# Patient Record
Sex: FEMALE | Race: WHITE | Employment: FULL TIME | URBAN - NONMETROPOLITAN AREA
[De-identification: names, ages, dates, MRNs, and addresses within clinical notes are randomized per-mention and may not be internally consistent; named-entity substitution may affect disease eponyms.]

---

## 2017-07-03 ENCOUNTER — TELEPHONE (OUTPATIENT)
Dept: FAMILY MEDICINE CLINIC | Age: 32
End: 2017-07-03

## 2017-07-03 DIAGNOSIS — N92.6 MISSED PERIOD: Primary | ICD-10-CM

## 2017-07-03 DIAGNOSIS — N92.6 MISSED PERIOD: ICD-10-CM

## 2017-07-03 LAB — GONADOTROPIN, CHORIONIC (HCG) QUANT: 311.8 MIU/ML

## 2017-12-12 ENCOUNTER — HOSPITAL ENCOUNTER (OUTPATIENT)
Age: 32
Discharge: HOME | End: 2017-12-12
Attending: OBSTETRICS & GYNECOLOGY | Admitting: OBSTETRICS & GYNECOLOGY
Payer: COMMERCIAL

## 2017-12-12 VITALS
WEIGHT: 169.2 LBS | TEMPERATURE: 97.8 F | BODY MASS INDEX: 28.89 KG/M2 | HEART RATE: 109 BPM | HEIGHT: 64 IN | SYSTOLIC BLOOD PRESSURE: 137 MMHG | DIASTOLIC BLOOD PRESSURE: 67 MMHG | RESPIRATION RATE: 18 BRPM

## 2017-12-12 LAB
AMORPHOUS: ABNORMAL
AMPHETAMINE SCREEN, URINE: NORMAL
BACTERIA: ABNORMAL /HPF
BARBITURATE SCREEN URINE: NORMAL
BENZODIAZEPINE SCREEN, URINE: NORMAL
BILIRUBIN URINE: NEGATIVE
BLOOD, URINE: ABNORMAL
CANNABINOID SCREEN URINE: NORMAL
CLARITY: ABNORMAL
COCAINE METABOLITE SCREEN URINE: NORMAL
COLOR: ABNORMAL
EPITHELIAL CELLS, UA: ABNORMAL /HPF
GLUCOSE URINE: NEGATIVE MG/DL
KETONES, URINE: NEGATIVE MG/DL
LEUKOCYTE ESTERASE, URINE: ABNORMAL
Lab: NORMAL
MUCUS: PRESENT
NITRITE, URINE: NEGATIVE
OPIATE SCREEN URINE: NORMAL
PH UA: 7.5 (ref 5–9)
PHENCYCLIDINE SCREEN URINE: NORMAL
PROTEIN UA: NEGATIVE MG/DL
RBC UA: ABNORMAL /HPF (ref 0–2)
SPECIFIC GRAVITY UA: 1.01 (ref 1–1.03)
UROBILINOGEN, URINE: 0.2 E.U./DL
WBC UA: ABNORMAL /HPF (ref 0–5)

## 2017-12-12 PROCEDURE — 6360000002 HC RX W HCPCS

## 2017-12-12 PROCEDURE — 99284 EMERGENCY DEPT VISIT MOD MDM: CPT

## 2017-12-12 PROCEDURE — 81001 URINALYSIS AUTO W/SCOPE: CPT

## 2017-12-12 PROCEDURE — 96361 HYDRATE IV INFUSION ADD-ON: CPT

## 2017-12-12 PROCEDURE — 96372 THER/PROPH/DIAG INJ SC/IM: CPT

## 2017-12-12 PROCEDURE — 80307 DRUG TEST PRSMV CHEM ANLYZR: CPT

## 2017-12-12 PROCEDURE — 59025 FETAL NON-STRESS TEST: CPT

## 2017-12-12 PROCEDURE — 96375 TX/PRO/DX INJ NEW DRUG ADDON: CPT

## 2017-12-12 PROCEDURE — 96374 THER/PROPH/DIAG INJ IV PUSH: CPT

## 2017-12-12 PROCEDURE — 2580000003 HC RX 258

## 2017-12-12 RX ORDER — ONDANSETRON 2 MG/ML
INJECTION INTRAMUSCULAR; INTRAVENOUS
Status: COMPLETED
Start: 2017-12-12 | End: 2017-12-12

## 2017-12-12 RX ORDER — TERBUTALINE SULFATE 1 MG/ML
0.25 INJECTION, SOLUTION SUBCUTANEOUS ONCE
Status: COMPLETED | OUTPATIENT
Start: 2017-12-12 | End: 2017-12-12

## 2017-12-12 RX ORDER — SODIUM CHLORIDE, SODIUM LACTATE, POTASSIUM CHLORIDE, CALCIUM CHLORIDE 600; 310; 30; 20 MG/100ML; MG/100ML; MG/100ML; MG/100ML
INJECTION, SOLUTION INTRAVENOUS
Status: COMPLETED
Start: 2017-12-12 | End: 2017-12-12

## 2017-12-12 RX ORDER — ONDANSETRON 2 MG/ML
4 INJECTION INTRAMUSCULAR; INTRAVENOUS EVERY 6 HOURS PRN
Status: DISCONTINUED | OUTPATIENT
Start: 2017-12-12 | End: 2017-12-12 | Stop reason: HOSPADM

## 2017-12-12 RX ORDER — NALBUPHINE HCL 10 MG/ML
AMPUL (ML) INJECTION
Status: COMPLETED
Start: 2017-12-12 | End: 2017-12-12

## 2017-12-12 RX ORDER — SODIUM CHLORIDE, SODIUM LACTATE, POTASSIUM CHLORIDE, CALCIUM CHLORIDE 600; 310; 30; 20 MG/100ML; MG/100ML; MG/100ML; MG/100ML
1000 INJECTION, SOLUTION INTRAVENOUS ONCE
Status: COMPLETED | OUTPATIENT
Start: 2017-12-12 | End: 2017-12-12

## 2017-12-12 RX ORDER — NALBUPHINE HCL 10 MG/ML
10 AMPUL (ML) INJECTION
Status: DISCONTINUED | OUTPATIENT
Start: 2017-12-12 | End: 2017-12-12 | Stop reason: HOSPADM

## 2017-12-12 RX ORDER — TERBUTALINE SULFATE 1 MG/ML
INJECTION, SOLUTION SUBCUTANEOUS
Status: COMPLETED
Start: 2017-12-12 | End: 2017-12-12

## 2017-12-12 RX ADMIN — NALBUPHINE HYDROCHLORIDE 10 MG: 10 INJECTION, SOLUTION INTRAMUSCULAR; INTRAVENOUS; SUBCUTANEOUS at 03:35

## 2017-12-12 RX ADMIN — Medication 10 MG: at 03:35

## 2017-12-12 RX ADMIN — TERBUTALINE SULFATE 0.25 MG: 1 INJECTION, SOLUTION SUBCUTANEOUS at 03:14

## 2017-12-12 RX ADMIN — ONDANSETRON 4 MG: 2 INJECTION INTRAMUSCULAR; INTRAVENOUS at 03:16

## 2017-12-12 RX ADMIN — SODIUM CHLORIDE, SODIUM LACTATE, POTASSIUM CHLORIDE, CALCIUM CHLORIDE 1000 ML: 600; 310; 30; 20 INJECTION, SOLUTION INTRAVENOUS at 02:44

## 2017-12-12 RX ADMIN — TERBUTALINE SULFATE 0.25 MG: 1 INJECTION SUBCUTANEOUS at 03:14

## 2017-12-12 RX ADMIN — SODIUM CHLORIDE, POTASSIUM CHLORIDE, SODIUM LACTATE AND CALCIUM CHLORIDE 1000 ML: 600; 310; 30; 20 INJECTION, SOLUTION INTRAVENOUS at 02:44

## 2017-12-12 ASSESSMENT — PAIN SCALES - GENERAL: PAINLEVEL_OUTOF10: 9

## 2017-12-12 NOTE — FLOWSHEET NOTE
D/c instructions given to pt. No questions at this time. States she will call in the morning for an OB follow up appointment.

## 2017-12-12 NOTE — ED PROVIDER NOTES
Department of Obstetrics and Gynecology  Labor and Delivery  Attending Triage Note      SUBJECTIVE:  33 y.o.  Ghulam Tierney @ 27w1d  Presents with complaints of   Chief Complaint   Patient presents with    Abdominal Pain     stated at 9pm in 12--17       previous  section. States was told she has a placenta previa. No bleeding, LOF or UCS. Pain started in her back  Fetal Movement    Yes  ROM No  Vaginal Bleeding No  Contractions No    OBJECTIVE    Vitals:  /67   Pulse 109   Temp 97.8 °F (36.6 °C) (Oral)   Resp 18   Ht 5' 4\" (1.626 m)   Wt 169 lb 3.2 oz (76.7 kg)   LMP 2017   BMI 29.04 kg/m²     CONSTITUTIONAL:  awake, alert, cooperative, no apparent distress, and appears stated age    Cervix:             Dilation:  Closed         Effacement:  Long         Station:  -5 cm         Consistency:  firm         Position:  posterior    Fetal Position:  Unable to tell    Membranes:  Intact    Fetal heart rate:         Baseline Heart Rate:  150        Accelerations:  present       Decelerations:  none       Variability:  moderate    Contraction frequency: 0 minutes    Left lower back muscle strain left round ligament pain. Uterus soft, cx closed long and thick. upper abdominal distention with gas    DATA:  Labs Reviewed   URINALYSIS - Abnormal; Notable for the following:        Result Value    Clarity, UA SL CLOUDY (*)     Blood, Urine TRACE (*)     Leukocyte Esterase, Urine SMALL (*)     All other components within normal limits   MICROSCOPIC URINALYSIS - Abnormal; Notable for the following:     WBC, UA 6-10 (*)     RBC, UA 3-5 (*)     All other components within normal limits   URINE DRUG SCREEN       ASSESSMENT & PLAN:   1)ligament pain  2) dehydration  3)needs repeat US to recheck the placenta.    Increase fluids  discharge

## 2017-12-12 NOTE — FLOWSHEET NOTE
1524 Dr. Justyn Tilley at bedside; SVE; pt closed and thick per dr; pt to be discharged; pt denies current contractions or pain

## 2018-11-09 ENCOUNTER — OFFICE VISIT (OUTPATIENT)
Dept: PRIMARY CARE CLINIC | Age: 33
End: 2018-11-09
Payer: COMMERCIAL

## 2018-11-09 VITALS
HEART RATE: 84 BPM | TEMPERATURE: 98.1 F | BODY MASS INDEX: 29.37 KG/M2 | DIASTOLIC BLOOD PRESSURE: 86 MMHG | SYSTOLIC BLOOD PRESSURE: 135 MMHG | WEIGHT: 172 LBS | HEIGHT: 64 IN | OXYGEN SATURATION: 96 %

## 2018-11-09 DIAGNOSIS — L03.213 PRESEPTAL CELLULITIS OF LEFT UPPER EYELID: Primary | ICD-10-CM

## 2018-11-09 PROCEDURE — 99202 OFFICE O/P NEW SF 15 MIN: CPT | Performed by: NURSE PRACTITIONER

## 2018-11-09 RX ORDER — CLINDAMYCIN HYDROCHLORIDE 300 MG/1
300 CAPSULE ORAL 3 TIMES DAILY
Qty: 30 CAPSULE | Refills: 0 | Status: SHIPPED | OUTPATIENT
Start: 2018-11-09 | End: 2018-11-19

## 2018-11-09 ASSESSMENT — ENCOUNTER SYMPTOMS
EYE REDNESS: 1
WHEEZING: 0
COUGH: 0
EYE DISCHARGE: 0
EYE ITCHING: 1
VOMITING: 0
PHOTOPHOBIA: 0
DOUBLE VISION: 0
SHORTNESS OF BREATH: 0
NAUSEA: 0
EYE PAIN: 0
SORE THROAT: 0

## 2018-11-09 NOTE — PROGRESS NOTES
[address] [facility] WALK-IN [facility] [id number]  Dept: [phone]  Dept Fax: [phone]    Joseph Azar is a 29 y.o. female who presents to the [facility] in Care today for hermedical conditions/complaints as noted below. Joseph Azar is c/o of Eye Drainage (Patient presents today for left eye redness, itchyness, burning, and swelling that started around two days ago. Pt has not applied and creams or drops to the eye. Snellen Eye Chart not corrected: Right eye 20/20 Left 20/25) and Blepharitis      HPI:     Conjunctivitis    The current episode started 3 to 5 days ago (Started on 11/07/2018 with redness and irritation to left eye, thought it was stye but is progressively getting worse, more red and swollne across top eyelid. Very tender in outer corner of left eye.  ). The onset was sudden. The problem occurs rarely. The problem has been gradually worsening. The problem is moderate. Nothing relieves the symptoms. Nothing aggravates the symptoms. Associated symptoms include eye itching and eye redness. Pertinent negatives include no fever, no decreased vision, no double vision, no photophobia, no nausea, no vomiting, no congestion, no ear discharge, no ear pain, no headaches, no sore throat, no cough, no wheezing, no rash, no eye discharge and no eye pain. The left eye is affected. The eye pain is not associated with movement. The eyelid exhibits swelling and redness. Past Medical History:   Diagnosis Date    Kidney stone 9/2015    right, 5mm nonobstructing    Ovarian cyst 9/2015    right        Current Outpatient Prescriptions   Medication Sig Dispense Refill    clindamycin (CLEOCIN) 300 MG capsule Take 1 capsule by mouth 3 times daily for 10 days 30 capsule 0    GILDESS 1/20 1-20 MG-MCG per tablet       ibuprofen (ADVIL;MOTRIN) 400 MG tablet Take 400 mg by mouth every 12 hours       No current facility-administered medications for this visit. given for Eye Celluliits and Clindamycin. · Follow up with PCP or Walk in Care if symptoms worsen or does not go away after 1 week. · To ER for any visual changes, increased pain in the eye, photophobia (light sensitivity), redness increases or spreads to your face or cheek, or increase in eye discharge. Safia Aviles received counseling on the following healthy behaviors: medication adherence. Patient given educational materials - see patient instructions. Discussed use,benefit, and side effects of prescribed medications. Treatment plan discussed atvisit. Continue routine health care follow up. All patient questions answered. Pt voiced understanding.       Electronically signed by CHANTAL Garcia CNP on 11/9/2018 at 4:35 PM

## 2018-11-09 NOTE — PATIENT INSTRUCTIONS
SURVEY:    You may be receiving a survey from [employer] regarding your visit today. Please complete the survey to enable us to provide the highest quality of care to you and your family. If you cannot score us a very good on any question, please call the office to discuss how we could of made your experience a very good one. Thank you. Patient Education   Patient Education          clindamycin (oral/injection)  Pronunciation:  klin da MYE sin  Brand:  Cleocin HCl, Cleocin Pediatric, Cleocin Phosphate  What is the most important information I should know about clindamycin? Clindamycin can cause diarrhea, which may be severe or lead to serious, life-threatening intestinal problems. If you have diarrhea that is watery or bloody, stop using clindamycin and call your doctor. What is clindamycin? Clindamycin is an antibiotic that fights bacteria in the body. Clindamycin is used to treat serious infections caused by bacteria. Clindamycin may also be used for purposes not listed in this medication guide. What should I discuss with my healthcare provider before using clindamycin? You should not use this medicine if you are allergic to clindamycin or lincomycin. Tell your doctor if you have ever had:  · colitis, Crohn's disease, or other intestinal disorder;  · eczema, or allergic skin reaction;  · asthma or a severe allergic reaction to aspirin;  · liver disease; or  · an allergy to yellow food dye. It is not known whether this medicine will harm an unborn baby. Tell your doctor if you are pregnant or plan to become pregnant. It may not be safe to breast-feed a baby while you are using this medicine. Ask your doctor about any risks. Clindamycin injection may contain an ingredient that can cause serious side effects or death in very young or premature babies. Do not give this medicine to a child without medical advice. How should I use clindamycin?   Follow all directions on your prescription label and read all medication guides or instruction sheets. Use the medicine exactly as directed. Take the capsule with a full glass of water to keep it from irritating your throat. Measure liquid medicine carefully. Use the dosing syringe provided, or use a medicine dose-measuring device (not a kitchen spoon). Clindamycin injection is injected into a muscle, or as an infusion into a vein. A healthcare provider will give your first dose and may teach you how to properly use the medication by yourself. You may need frequent medical tests during treatment. If you need surgery, tell your surgeon you currently use clindamycin. Use this medicine for the full prescribed length of time, even if your symptoms quickly improve. Skipping doses can increase your risk of infection that is resistant to medication. Clindamycin will not treat a viral infection such as the flu or a common cold. Store at room temperature away from moisture and heat. Protect the injectable medicine from high heat. Do not store the oral liquid in the refrigerator. Throw away any unused oral liquid after 2 weeks. Use a needle and syringe only once and then place them in a puncture-proof \"sharps\" container. Follow state or local laws about how to dispose of this container. Keep it out of the reach of children and pets. What happens if I miss a dose? Use the medicine as soon as you can, but skip the missed dose if it is almost time for your next dose. Do not use two doses at one time. What happens if I overdose? Seek emergency medical attention or call the Poison Help line at 1-393.224.4995. What should I avoid while using clindamycin? Antibiotic medicines can cause diarrhea, which may be a sign of a new infection. If you have diarrhea that is watery or bloody, call your doctor. Do not use anti-diarrhea medicine unless your doctor tells you to. What are the possible side effects of clindamycin?   Get emergency medical help if you have signs of an

## 2021-05-30 ENCOUNTER — HOSPITAL ENCOUNTER (EMERGENCY)
Age: 36
Discharge: HOME | End: 2021-05-30
Attending: FAMILY MEDICINE
Payer: COMMERCIAL

## 2021-05-30 ENCOUNTER — APPOINTMENT (OUTPATIENT)
Dept: GENERAL RADIOLOGY | Age: 36
End: 2021-05-30
Payer: COMMERCIAL

## 2021-05-30 VITALS
TEMPERATURE: 98.8 F | HEART RATE: 103 BPM | RESPIRATION RATE: 18 BRPM | WEIGHT: 203.7 LBS | BODY MASS INDEX: 34.78 KG/M2 | OXYGEN SATURATION: 100 % | DIASTOLIC BLOOD PRESSURE: 88 MMHG | SYSTOLIC BLOOD PRESSURE: 143 MMHG | HEIGHT: 64 IN

## 2021-05-30 DIAGNOSIS — T59.811A SMOKE INHALATION WITHOUT LOSS OF CONSCIOUSNESS: Primary | ICD-10-CM

## 2021-05-30 LAB
ALLEN TEST: POSITIVE
FIO2: 21
MODE: ABNORMAL
NEGATIVE BASE EXCESS, ART: 2 (ref 0–2)
O2 DEVICE/FLOW/%: ABNORMAL
PATIENT TEMP: ABNORMAL
POC HCO3: 21.8 MMOL/L (ref 21–28)
POC O2 SATURATION: 98 % (ref 94–98)
POC PCO2 TEMP: ABNORMAL MM HG
POC PCO2: 32.7 MM HG (ref 35–48)
POC PH TEMP: ABNORMAL
POC PH: 7.43 (ref 7.35–7.45)
POC PO2 TEMP: ABNORMAL MM HG
POC PO2: 102.1 MM HG (ref 83–108)
POSITIVE BASE EXCESS, ART: ABNORMAL (ref 0–3)
SAMPLE SITE: ABNORMAL
TCO2 (CALC), ART: ABNORMAL MMOL/L (ref 22–29)

## 2021-05-30 PROCEDURE — 99283 EMERGENCY DEPT VISIT LOW MDM: CPT

## 2021-05-30 PROCEDURE — 71045 X-RAY EXAM CHEST 1 VIEW: CPT

## 2021-05-30 PROCEDURE — 36600 WITHDRAWAL OF ARTERIAL BLOOD: CPT

## 2021-05-30 PROCEDURE — 82803 BLOOD GASES ANY COMBINATION: CPT

## 2021-05-30 PROCEDURE — 6360000002 HC RX W HCPCS: Performed by: FAMILY MEDICINE

## 2021-05-30 RX ORDER — DEXAMETHASONE SODIUM PHOSPHATE 10 MG/ML
10 INJECTION, SOLUTION INTRAMUSCULAR; INTRAVENOUS ONCE
Status: COMPLETED | OUTPATIENT
Start: 2021-05-30 | End: 2021-05-30

## 2021-05-30 RX ADMIN — DEXAMETHASONE SODIUM PHOSPHATE 10 MG: 10 INJECTION, SOLUTION INTRAMUSCULAR; INTRAVENOUS at 11:29
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
## 2021-08-11 ENCOUNTER — HOSPITAL ENCOUNTER (OUTPATIENT)
Age: 36
Setting detail: SPECIMEN
Discharge: HOME | End: 2021-08-11
Payer: COMMERCIAL

## 2021-08-11 ENCOUNTER — OFFICE VISIT (OUTPATIENT)
Dept: PRIMARY CARE CLINIC | Age: 36
End: 2021-08-11
Payer: COMMERCIAL

## 2021-08-11 VITALS
HEART RATE: 113 BPM | WEIGHT: 178.1 LBS | HEIGHT: 64 IN | DIASTOLIC BLOOD PRESSURE: 84 MMHG | SYSTOLIC BLOOD PRESSURE: 131 MMHG | OXYGEN SATURATION: 97 % | BODY MASS INDEX: 30.4 KG/M2 | TEMPERATURE: 98.7 F

## 2021-08-11 DIAGNOSIS — R30.0 DYSURIA: ICD-10-CM

## 2021-08-11 DIAGNOSIS — N30.01 ACUTE CYSTITIS WITH HEMATURIA: Primary | ICD-10-CM

## 2021-08-11 DIAGNOSIS — N30.01 ACUTE CYSTITIS WITH HEMATURIA: ICD-10-CM

## 2021-08-11 LAB
BILIRUBIN, POC: ABNORMAL
BLOOD URINE, POC: ABNORMAL
CLARITY, POC: ABNORMAL
COLOR, POC: ABNORMAL
CONTROL: PRESENT
GLUCOSE URINE, POC: ABNORMAL
KETONES, POC: ABNORMAL
LEUKOCYTE EST, POC: ABNORMAL
NITRITE, POC: ABNORMAL
PH, POC: 5
PREGNANCY TEST URINE, POC: NORMAL
PROTEIN, POC: ABNORMAL
SPECIFIC GRAVITY, POC: 1.02
UROBILINOGEN, POC: 1

## 2021-08-11 PROCEDURE — 87088 URINE BACTERIA CULTURE: CPT

## 2021-08-11 PROCEDURE — 87086 URINE CULTURE/COLONY COUNT: CPT

## 2021-08-11 PROCEDURE — 81025 URINE PREGNANCY TEST: CPT | Performed by: NURSE PRACTITIONER

## 2021-08-11 PROCEDURE — 99213 OFFICE O/P EST LOW 20 MIN: CPT | Performed by: NURSE PRACTITIONER

## 2021-08-11 PROCEDURE — 87186 SC STD MICRODIL/AGAR DIL: CPT

## 2021-08-11 PROCEDURE — 81003 URINALYSIS AUTO W/O SCOPE: CPT | Performed by: NURSE PRACTITIONER

## 2021-08-11 RX ORDER — CIPROFLOXACIN 500 MG/1
500 TABLET, FILM COATED ORAL 2 TIMES DAILY
Qty: 14 TABLET | Refills: 0 | Status: SHIPPED | OUTPATIENT
Start: 2021-08-11 | End: 2021-08-18

## 2021-08-11 RX ORDER — NITROFURANTOIN 25; 75 MG/1; MG/1
100 CAPSULE ORAL 2 TIMES DAILY
Qty: 14 CAPSULE | Refills: 0 | Status: SHIPPED | OUTPATIENT
Start: 2021-08-11 | End: 2021-08-11 | Stop reason: ALTCHOICE

## 2021-08-11 ASSESSMENT — ENCOUNTER SYMPTOMS
COUGH: 0
SORE THROAT: 0
NAUSEA: 0
SHORTNESS OF BREATH: 0
DIARRHEA: 0
ABDOMINAL PAIN: 1
VOMITING: 0
WHEEZING: 0
BLOOD IN STOOL: 0
RHINORRHEA: 0

## 2021-08-11 NOTE — PROGRESS NOTES
[address] [facility] WALK-IN CARE  [address] [facility]    [facility] [address]  Dept: [phone]  Dept Fax: [phone]     Mikki Zafar is a 30 y.o. female who presents to the [facility] in Care today for hermedical conditions/complaints as noted below. Mikki Zafar is c/o of Dysuria (Lower back and abdominal pain, fatigue, chills, no fever. rapid covid was negative,  \"Two weeks ago had UTI  used OTC mediication\")      HPI:     Dysuria   This is a new problem. The current episode started yesterday (Fatigue unbearable yesterday. Back pain and abdominal pain started on Monday, on right side shoots across to left side. Myalgias yesterday. Covid-19 test negative. Treated UTI 2 weeks ago with OTC.). The problem occurs intermittently. The problem has been unchanged. The quality of the pain is described as aching. The pain is at a severity of 7/10 (B/L flank pain, constant ache with intermittent sharp pain. ). The pain is moderate. There has been no fever. She is sexually active. There is no history of pyelonephritis. Associated symptoms include chills, flank pain (Started on right and shoots across to left side.) and sweats. Pertinent negatives include no discharge, frequency, hematuria, nausea, possible pregnancy, urgency or vomiting. Treatments tried: OTC UTI medicine 2 weeks ago. The treatment provided significant (Relieved symptoms.) relief. Her past medical history is significant for kidney stones. There is no history of catheterization, recurrent UTIs, a single kidney, urinary stasis or a urological procedure. Drink lots of pop and coffee.           Past Medical History:   Diagnosis Date    Kidney stone 9/2015    right, 5mm nonobstructing    Ovarian cyst 9/2015    right        Current Outpatient Medications   Medication Sig Dispense Refill    ciprofloxacin (CIPRO) 500 MG tablet Take 1 tablet by mouth 2 times daily for 7 days 14 tablet 0    ibuprofen (ADVIL;MOTRIN) 400 MG tablet Take 400 mg by mouth every 12 hours (Patient not taking: Reported on 8/11/2021)       No current facility-administered medications for this visit. Allergies   Allergen Reactions    Septra [Sulfamethoxazole-Trimethoprim]     Sulfamethoxazole-Trimethoprim Hives       Subjective:     Review of Systems   Constitutional: Positive for appetite change (zero appetite yesterday), chills, diaphoresis and fatigue. Negative for fever. HENT: Negative for congestion, rhinorrhea and sore throat. Respiratory: Negative for cough, shortness of breath and wheezing. Gastrointestinal: Positive for abdominal pain (Yesterday mainly in front. ). Negative for blood in stool, diarrhea, nausea and vomiting. Genitourinary: Positive for flank pain (Started on right and shoots across to left side. ). Negative for dysuria, frequency, hematuria, urgency, vaginal bleeding and vaginal discharge. Skin: Negative for rash and wound. Neurological: Positive for dizziness (Little bit) and light-headedness. Negative for headaches. Objective:      Physical Exam  Vitals and nursing note reviewed. Constitutional:       General: She is not in acute distress. Appearance: Normal appearance. She is well-developed. She is not ill-appearing or diaphoretic. Comments: Well hydrated, nontoxic appearance. HENT:      Head: Normocephalic and atraumatic. Right Ear: External ear normal.      Left Ear: External ear normal.   Eyes:      Conjunctiva/sclera: Conjunctivae normal.   Cardiovascular:      Rate and Rhythm: Regular rhythm. Tachycardia present. Heart sounds: Normal heart sounds, S1 normal and S2 normal. No murmur heard. No friction rub. No gallop. Pulmonary:      Effort: Pulmonary effort is normal. No accessory muscle usage or respiratory distress. Breath sounds: Normal breath sounds and air entry. No decreased breath sounds, wheezing, rhonchi or rales.       Comments: Breath sounds clear B/L anterior and posterior lobes. Chest expansion symmetrical.  No audible wheezing or respiratory distress. No rales or rhonchi. Abdominal:      General: Bowel sounds are normal. There is no distension. Palpations: Abdomen is soft. Tenderness: There is no abdominal tenderness. There is right CVA tenderness. There is no left CVA tenderness, guarding or rebound. Musculoskeletal:         General: Normal range of motion. Cervical back: Neck supple. Lymphadenopathy:      Cervical: No cervical adenopathy. Right cervical: No superficial or posterior cervical adenopathy. Left cervical: No superficial or posterior cervical adenopathy. Skin:     General: Skin is warm and dry. Coloration: Skin is not pale. Findings: No erythema or rash. Neurological:      Mental Status: She is alert and oriented to person, place, and time. Psychiatric:         Behavior: Behavior normal. Behavior is cooperative. /84 (Site: Left Upper Arm, Position: Sitting, Cuff Size: Medium Adult)   Pulse 113   Temp 98.7 °F (37.1 °C)   Ht 5' 4\" (1.626 m)   Wt 178 lb 1.6 oz (80.8 kg)   LMP 08/04/2021 (Approximate)   SpO2 97%   BMI 30.57 kg/m²     Results for orders placed or performed in visit on 08/11/21   POCT Urinalysis No Micro (Auto)   Result Value Ref Range    Color, UA Dark yellow     Clarity, UA cloudy     Glucose, UA POC neg     Bilirubin, UA sm     Ketones, UA neg     Spec Grav, UA 1.025     Blood, UA POC lg (A)     pH, UA 5.0     Protein, UA POC pos (A)     Urobilinogen, UA 1.0     Leukocytes, UA mod (A)     Nitrite, UA pos (A)    POCT urine pregnancy   Result Value Ref Range    Preg Test, Ur neg     Control present      Will send urine culture. Assessment:      Diagnosis Orders   1. Acute cystitis with hematuria  POCT urine pregnancy    Culture, Urine    ciprofloxacin (CIPRO) 500 MG tablet    DISCONTINUED: nitrofurantoin, macrocrystal-monohydrate, (MACROBID) 100 MG capsule   2.  Dysuria  POCT Urinalysis No Micro (Auto)       Plan:      Return if symptoms worsen or fail to improve. Orders Placed This Encounter   Medications    DISCONTD: nitrofurantoin, macrocrystal-monohydrate, (MACROBID) 100 MG capsule     Sig: Take 1 capsule by mouth 2 times daily for 7 days     Dispense:  14 capsule     Refill:  0    ciprofloxacin (CIPRO) 500 MG tablet     Sig: Take 1 tablet by mouth 2 times daily for 7 days     Dispense:  14 tablet     Refill:  0      · Encouraged to increase fluids and to eat nutritiously. · Avoid full bladder, bubble baths, bath oils and hot tubs. · Wipe front to back and void after sexual intercourse. · Continue antibiotic as prescribed until all doses are completed. Changed antibiotic from Macrobid to Ciprofloxacin to treat for complicated acute cystitis, possible pyelonephritis. Called [address] and cancelled Macrobid order. Liliana Simpson called and notified of change in antibiotic to Ciprofloxacin, verbalized understanding. · Probiotic OTC or greek yogurt daily while on antibiotic  · Will call with urine culture results once obtained. · Patient instructions given for urinary tract infection and macrobid. · To ER or call 911 if any difficulty breathing, shortness of breath, inability to swallow, hives, rash, facial/tongue swelling or temp greater than 103 degrees. · Follow up with PCP or Walk in Care if symptoms worsen or do not improve. Liliana Simpson received counseling on the following healthy behaviors: medication adherence. Patient given educational materials - see patient instructions. Discussed use,benefit, and side effects of prescribed medications. Treatment plan discussed at visit. Continue routine health care follow up. All patient questions answered. Pt voiced understanding.       Electronically signed by CHANTAL Edgar CNP on 8/11/2021 at 3:40 PM

## 2021-08-11 NOTE — PATIENT INSTRUCTIONS
SURVEY:    You may be receiving a survey from [employer] regarding your visit today. Please complete the survey to enable us to provide the highest quality of care to you and your family. If you cannot score us a very good on any question, please call the office to discuss how we could of made your experience a very good one. Thank you. Patient Education        Urinary Tract Infection (UTI) in Women: Care Instructions  Overview     A urinary tract infection, or UTI, is a general term for an infection anywhere between the kidneys and the urethra (where urine comes out). Most UTIs are bladder infections. They often cause pain or burning when you urinate. UTIs are caused by bacteria and can be cured with antibiotics. Be sure to complete your treatment so that the infection does not get worse. Follow-up care is a key part of your treatment and safety. Be sure to make and go to all appointments, and call your doctor if you are having problems. It's also a good idea to know your test results and keep a list of the medicines you take. How can you care for yourself at home? · Take your antibiotics as directed. Do not stop taking them just because you feel better. You need to take the full course of antibiotics. · Drink extra water and other fluids for the next day or two. This will help make the urine less concentrated and help wash out the bacteria that are causing the infection. (If you have kidney, heart, or liver disease and have to limit fluids, talk with your doctor before you increase the amount of fluids you drink.)  · Avoid drinks that are carbonated or have caffeine. They can irritate the bladder. · Urinate often. Try to empty your bladder each time. · To relieve pain, take a hot bath or lay a heating pad set on low over your lower belly or genital area. Never go to sleep with a heating pad in place. To prevent UTIs  · Drink plenty of water each day.  This helps you urinate often, which clears bacteria from your system. (If you have kidney, heart, or liver disease and have to limit fluids, talk with your doctor before you increase the amount of fluids you drink.)  · Urinate when you need to. · If you are sexually active, urinate right after you have sex. · Change sanitary pads often. · Avoid douches, bubble baths, feminine hygiene sprays, and other feminine hygiene products that have deodorants. · After going to the bathroom, wipe from front to back. When should you call for help? Call your doctor now or seek immediate medical care if:    · Symptoms such as fever, chills, nausea, or vomiting get worse or appear for the first time.     · You have new pain in your back just below your rib cage. This is called flank pain.     · There is new blood or pus in your urine.     · You have any problems with your antibiotic medicine. Watch closely for changes in your health, and be sure to contact your doctor if:    · You are not getting better after taking an antibiotic for 2 days.     · Your symptoms go away but then come back. Where can you learn more? Go to [email or web address] and sign in to your [employer] account. Enter Q596 in the [facility] box to learn more about \"Urinary Tract Infection (UTI) in Women: Care Instructions. \"     If you do not have an account, please click on the \"Sign Up Now\" link. Current as of: February 10, 2021               Content Version: 12.9  © 2006-2021 [employer] [facility]. Care instructions adapted under license by [facility] ([facility]). If you have questions about a medical condition or this instruction, always ask your healthcare professional. [place] [address] any warranty or liability for your use of this information. Patient Education        nitrofurantoin  Pronunciation:  LAUREN rojas  Brand:  Macrobid, Macrodantin  What is the most important information I should know about nitrofurantoin?   You should not take nitrofurantoin if you have severe kidney disease, urination problems, or a history of jaundice or liver problems caused by nitrofurantoin. Do not take nitrofurantoin during late pregnancy (from 38 weeks through delivery). What is nitrofurantoin? Nitrofurantoin is an antibiotic that is used to treat urinary tract infections caused by bacteria. Nitrofurantoin may also be used for purposes not listed in this medication guide. What should I discuss with my healthcare provider before taking nitrofurantoin? You should not take nitrofurantoin if you are allergic to it, or if you have:  · severe kidney disease;  · urination problems (little or no urination); or  · a history of jaundice or liver problems caused by taking nitrofurantoin. Do not take nitrofurantoin during late pregnancy (from 38 weeks through delivery). Tell your doctor if you have ever had:  · kidney disease;  · anemia;  · diabetes;  · an electrolyte imbalance or vitamin B deficiency;  · glucose-6-phosphate dehydrogenase (G6PD) deficiency; or  · any type of debilitating disease. You should not breastfeed a baby younger than 2 month old while you are taking nitrofurantoin. Nitrofurantoin should not be given to a child younger than 2 month old. How should I take nitrofurantoin? Follow all directions on your prescription label and read all medication guides or instruction sheets. Use the medicine exactly as directed. Take nitrofurantoin with food, even if you take it at bedtime. Shake the oral suspension (liquid) before you measure a dose. Use the dosing syringe provided, or use a medicine dose-measuring device (not a kitchen spoon). You may need to keep taking nitrofurantoin for up to 7 days after lab tests show that the infection has cleared. Follow your doctor's instructions. Use this medicine for the full prescribed length of time, even if your symptoms quickly improve.  Skipping doses can increase your risk of infection that is resistant to medication. Nitrofurantoin will not treat a viral infection such as the flu or a common cold. This medicine can affect the results of certain medical tests. Tell any doctor who treats you that you are using nitrofurantoin. If you use this medicine long-term, you may need frequent medical tests. Store at room temperature away from moisture, heat, and light. Do not freeze the liquid medicine, and keep the bottle tightly closed when not in use. Throw away any nitrofurantoin liquid that has not been used within 30 days. What happens if I miss a dose? Take the medicine as soon as you can, but skip the missed dose if it is almost time for your next dose. Do not take two doses at one time. What happens if I overdose? Seek emergency medical attention or call the Poison Help line at 1-257.431.6630. Overdose can cause vomiting. What should I avoid while taking nitrofurantoin? Antibiotic medicines can cause diarrhea, which may be a sign of a new infection. If you have diarrhea that is watery or bloody, call your doctor before using anti-diarrhea medicine. Avoid taking an antacid that contains magnesium trisilicate, which could make it harder for your body to absorb nitrofurantoin. What are the possible side effects of nitrofurantoin? Get emergency medical help if you have signs of an allergic reaction (hives, difficult breathing, swelling in your face or throat) or a severe skin reaction (fever, sore throat, burning eyes, skin pain, red or purple skin rash with blistering and peeling).   Call your doctor at once if you have:  · severe stomach pain, diarrhea that is watery or bloody (even if it occurs months after your last dose);  · vision problems;  · fever, chills, cough, chest pain, trouble breathing;  · numbness, tingling, or burning pain in your hands or feet;  · severe pain behind your eyes;  · pale skin, weakness;  · joint pain or swelling with fever, swollen glands, and muscle aches;  · pain, redness, or swelling in your lower jaw;  · increased pressure inside the skull --severe headaches, ringing in your ears, dizziness, nausea, vision problems, pain behind your eyes; or  · signs of liver or pancreas problems --upper stomach pain (that may spread to your back), nausea or vomiting, dark urine, yellowing of the skin or eyes. Side effects may be more likely in older adults. Common side effects may include:  · headache, dizziness, drowsiness, weakness;  · gas, indigestion, loss of appetite;  · nausea, vomiting;  · muscle or joint pain;  · rash, itching; or  · temporary hair loss. This is not a complete list of side effects and others may occur. Call your doctor for medical advice about side effects. You may report side effects to FDA at 2-131-FDA-5671. What other drugs will affect nitrofurantoin? Other drugs may affect nitrofurantoin, including prescription and over-the-counter medicines, vitamins, and herbal products. Tell your doctor about all your current medicines and any medicine you start or stop using. Where can I get more information? Your pharmacist can provide more information about nitrofurantoin. Remember, keep this and all other medicines out of the reach of children, never share your medicines with others, and use this medication only for the indication prescribed. Every effort has been made to ensure that the information provided by [address] is accurate, up-to-date, and complete, but no guarantee is made to that effect. Drug information contained herein may be time sensitive. [facility] information has been compiled for use by healthcare practitioners and consumers in the [facility] and therefore [facility] does not warrant that uses outside of the [facility] are appropriate, unless specifically indicated otherwise. [facility]'s drug information does not endorse drugs, diagnose patients or recommend therapy.  [facility]'s drug information is an informational resource designed to assist licensed healthcare practitioners in caring for their patients and/or to serve consumers viewing this service as a supplement to, and not a substitute for, the expertise, skill, knowledge and judgment of healthcare practitioners. The absence of a warning for a given drug or drug combination in no way should be construed to indicate that the drug or drug combination is safe, effective or appropriate for any given patient. [facility] does not assume any responsibility for any aspect of healthcare administered with the aid of information [facility] provides. The information contained herein is not intended to cover all possible uses, directions, precautions, warnings, drug interactions, allergic reactions, or adverse effects. If you have questions about the drugs you are taking, check with your doctor, nurse or pharmacist.  Copyright 9276-1248 [address]: 9.02. Revision date: 2/24/2020. Care instructions adapted under license by [facility] ([facility]). If you have questions about a medical condition or this instruction, always ask your healthcare professional. [place] [address] any warranty or liability for your use of this information. · Encouraged to increase fluids and to eat nutritiously. · Avoid full bladder, bubble baths, bath oils and hot tubs. · Wipe front to back and void after sexual intercourse. · Continue antibiotic as prescribed until all doses are completed  · Probiotic OTC or greek yogurt daily while on antibiotic  · Will call with urine culture results once obtained. · Patient instructions given for urinary tract infection and macrobid. · To ER or call 911 if any difficulty breathing, shortness of breath, inability to swallow, hives, rash, facial/tongue swelling or temp greater than 103 degrees. · Follow up with PCP or Walk in Care if symptoms worsen or do not improve.

## 2021-08-12 ENCOUNTER — HOSPITAL ENCOUNTER (EMERGENCY)
Age: 36
Discharge: HOME | End: 2021-08-12
Attending: EMERGENCY MEDICINE
Payer: COMMERCIAL

## 2021-08-12 VITALS
DIASTOLIC BLOOD PRESSURE: 81 MMHG | BODY MASS INDEX: 30.39 KG/M2 | TEMPERATURE: 98.3 F | SYSTOLIC BLOOD PRESSURE: 117 MMHG | OXYGEN SATURATION: 96 % | HEIGHT: 64 IN | RESPIRATION RATE: 18 BRPM | HEART RATE: 84 BPM | WEIGHT: 178 LBS

## 2021-08-12 DIAGNOSIS — N10 ACUTE PYELONEPHRITIS: Primary | ICD-10-CM

## 2021-08-12 LAB
-: ABNORMAL
AMORPHOUS: ABNORMAL
BACTERIA: ABNORMAL
BILIRUBIN URINE: NEGATIVE
CASTS UA: ABNORMAL /LPF
COLOR: YELLOW
COMMENT UA: ABNORMAL
CRYSTALS, UA: ABNORMAL /HPF
EPITHELIAL CELLS UA: ABNORMAL /HPF
GLUCOSE URINE: NEGATIVE
HCG(URINE) PREGNANCY TEST: NEGATIVE
KETONES, URINE: ABNORMAL
LEUKOCYTE ESTERASE, URINE: ABNORMAL
MUCUS: ABNORMAL
NITRITE, URINE: NEGATIVE
OTHER OBSERVATIONS UA: ABNORMAL
PH UA: 5 (ref 5–8)
PROTEIN UA: ABNORMAL
RBC UA: ABNORMAL /HPF (ref 0–2)
RENAL EPITHELIAL, UA: ABNORMAL /HPF
SARS-COV-2, RAPID: NOT DETECTED
SPECIFIC GRAVITY UA: 1.02 (ref 1–1.03)
SPECIMEN DESCRIPTION: NORMAL
TRICHOMONAS: ABNORMAL
TURBIDITY: ABNORMAL
URINE HGB: ABNORMAL
UROBILINOGEN, URINE: ABNORMAL
WBC UA: ABNORMAL /HPF
YEAST: ABNORMAL

## 2021-08-12 PROCEDURE — 2580000003 HC RX 258: Performed by: EMERGENCY MEDICINE

## 2021-08-12 PROCEDURE — 87086 URINE CULTURE/COLONY COUNT: CPT

## 2021-08-12 PROCEDURE — 81025 URINE PREGNANCY TEST: CPT

## 2021-08-12 PROCEDURE — 96375 TX/PRO/DX INJ NEW DRUG ADDON: CPT

## 2021-08-12 PROCEDURE — C9803 HOPD COVID-19 SPEC COLLECT: HCPCS

## 2021-08-12 PROCEDURE — 96365 THER/PROPH/DIAG IV INF INIT: CPT

## 2021-08-12 PROCEDURE — 87635 SARS-COV-2 COVID-19 AMP PRB: CPT

## 2021-08-12 PROCEDURE — 99284 EMERGENCY DEPT VISIT MOD MDM: CPT

## 2021-08-12 PROCEDURE — 6360000002 HC RX W HCPCS: Performed by: EMERGENCY MEDICINE

## 2021-08-12 PROCEDURE — 81001 URINALYSIS AUTO W/SCOPE: CPT

## 2021-08-12 RX ORDER — KETOROLAC TROMETHAMINE 30 MG/ML
15 INJECTION, SOLUTION INTRAMUSCULAR; INTRAVENOUS ONCE
Status: COMPLETED | OUTPATIENT
Start: 2021-08-12 | End: 2021-08-12

## 2021-08-12 RX ORDER — CEFDINIR 300 MG/1
300 CAPSULE ORAL 2 TIMES DAILY
Qty: 20 CAPSULE | Refills: 0 | Status: SHIPPED | OUTPATIENT
Start: 2021-08-12 | End: 2021-08-22

## 2021-08-12 RX ORDER — ONDANSETRON 4 MG/1
4 TABLET, FILM COATED ORAL EVERY 8 HOURS PRN
Qty: 12 TABLET | Refills: 0 | Status: SHIPPED | OUTPATIENT
Start: 2021-08-12 | End: 2021-08-17

## 2021-08-12 RX ORDER — 0.9 % SODIUM CHLORIDE 0.9 %
1000 INTRAVENOUS SOLUTION INTRAVENOUS ONCE
Status: COMPLETED | OUTPATIENT
Start: 2021-08-12 | End: 2021-08-12

## 2021-08-12 RX ORDER — KETOROLAC TROMETHAMINE 10 MG/1
10 TABLET, FILM COATED ORAL EVERY 8 HOURS PRN
Qty: 15 TABLET | Refills: 0 | Status: SHIPPED | OUTPATIENT
Start: 2021-08-12 | End: 2021-08-12 | Stop reason: SDUPTHER

## 2021-08-12 RX ORDER — KETOROLAC TROMETHAMINE 10 MG/1
10 TABLET, FILM COATED ORAL EVERY 8 HOURS PRN
Qty: 15 TABLET | Refills: 0 | Status: SHIPPED | OUTPATIENT
Start: 2021-08-12

## 2021-08-12 RX ADMIN — CEFTRIAXONE SODIUM 1000 MG: 1 INJECTION, POWDER, FOR SOLUTION INTRAMUSCULAR; INTRAVENOUS at 12:05

## 2021-08-12 RX ADMIN — SODIUM CHLORIDE 1000 ML: 9 INJECTION, SOLUTION INTRAVENOUS at 10:20

## 2021-08-12 RX ADMIN — KETOROLAC TROMETHAMINE 15 MG: 30 INJECTION, SOLUTION INTRAMUSCULAR; INTRAVENOUS at 10:21

## 2021-08-12 ASSESSMENT — PAIN DESCRIPTION - PAIN TYPE
TYPE: ACUTE PAIN
TYPE: ACUTE PAIN

## 2021-08-12 ASSESSMENT — PAIN DESCRIPTION - FREQUENCY: FREQUENCY: CONTINUOUS

## 2021-08-12 ASSESSMENT — PAIN DESCRIPTION - LOCATION
LOCATION: FLANK
LOCATION: FLANK

## 2021-08-12 ASSESSMENT — PAIN DESCRIPTION - DESCRIPTORS: DESCRIPTORS: ACHING;SHARP

## 2021-08-12 ASSESSMENT — PAIN DESCRIPTION - ORIENTATION
ORIENTATION: RIGHT
ORIENTATION: RIGHT

## 2021-08-12 ASSESSMENT — PAIN SCALES - GENERAL
PAINLEVEL_OUTOF10: 7
PAINLEVEL_OUTOF10: 2
PAINLEVEL_OUTOF10: 7

## 2021-08-12 NOTE — LETTER
[facility] ED  [address] [place], [address]  Phone: [phone]               August 12, 2021    Patient: Tree Goins   YOB: 1985   Date of Visit: 8/12/2021       To Whom It May Concern:    Meryle Daft was seen and treated in our emergency department on 8/12/2021. She may return to work on 8/16/2021.      Sincerely,       Nellie Bowman RN         Signature:__________________________________

## 2021-08-12 NOTE — ED PROVIDER NOTES
HPI:  21,   Time: 10:00 AM EDT         Patric Dance is a 30 y.o. female presenting to the ED for right flank pain, beginning 2 days ago. The complaint has been constant, moderate in severity, and worsened by palpation and movement. No fever but has had chills and no chest pain or shortness of breath or cough and was tested negative for Covid a few days ago and was placed on Cipro for suspected UTI has had only 2 doses but her pain is worsened since being on it. ROS:   Pertinent positives and negatives are stated within HPI, all other systems reviewed and are negative.  --------------------------------------------- PAST HISTORY ---------------------------------------------  Past Medical History:  has a past medical history of Kidney stone and Ovarian cyst.    Past Surgical History:  has a past surgical history that includes other surgical history (16);  section ();  section (2018); and Tubal ligation (2018). Social History:  reports that she has been smoking cigarettes. She has been smoking about 1.00 pack per day. She has never used smokeless tobacco. She reports current alcohol use. She reports that she does not use drugs. Family History: family history is not on file. The patients home medications have been reviewed. Allergies: Septra [sulfamethoxazole-trimethoprim] and Sulfamethoxazole-trimethoprim    -------------------------------------------------- RESULTS -------------------------------------------------  All laboratory and radiology results have been personally reviewed by myself   LABS:  Results for orders placed or performed during the hospital encounter of 21   COVID-19, Rapid    Specimen: Nasopharyngeal Swab   Result Value Ref Range    Specimen Description . NASOPHARYNGEAL SWAB     SARS-CoV-2, Rapid Not Detected Not Detected   Urinalysis, reflex to microscopic   Result Value Ref Range    Color, UA YELLOW YELLOW    Turbidity UA HAZY (A) CLEAR    Glucose, Ur NEGATIVE NEGATIVE    Bilirubin Urine NEGATIVE NEGATIVE    Ketones, Urine TRACE (A) NEGATIVE    Specific Gravity, UA 1.020 1.005 - 1.030    Urine Hgb 1+ (A) NEGATIVE    pH, UA 5.0 5.0 - 8.0    Protein, UA 1+ (A) NEGATIVE    Urobilinogen, Urine 8 mg/dL (A) Normal    Nitrite, Urine NEGATIVE NEGATIVE    Leukocyte Esterase, Urine 2+ (A) NEGATIVE    Urinalysis Comments         Pregnancy, Urine   Result Value Ref Range    HCG(Urine) Pregnancy Test NEGATIVE NEGATIVE   Microscopic Urinalysis   Result Value Ref Range    -          WBC, UA 10 TO 20 0 /HPF    RBC, UA 2 TO 5 0 - 2 /HPF    Casts UA NOT REPORTED /LPF    Crystals, UA NOT REPORTED None /HPF    Epithelial Cells UA 5 TO 10 /HPF    Renal Epithelial, UA NOT REPORTED 0 /HPF    Bacteria, UA 3+ (A) None    Mucus, UA 1+ (A) None    Trichomonas, UA NOT REPORTED None    Amorphous, UA NOT REPORTED None    Other Observations UA NOT REPORTED NOT REQ. Yeast, UA NOT REPORTED None       RADIOLOGY:  Interpreted by Radiologist.  No orders to display       ------------------------- NURSING NOTES AND VITALS REVIEWED ---------------------------   The nursing notes within the ED encounter and vital signs as below have been reviewed. /81   Pulse 84   Temp 98.3 °F (36.8 °C)   Resp 18   Ht 5' 4\" (1.626 m)   Wt 178 lb (80.7 kg)   LMP 08/04/2021 (Approximate)   SpO2 96%   BMI 30.55 kg/m²   Oxygen Saturation Interpretation: Normal      ---------------------------------------------------PHYSICAL EXAM--------------------------------------      Constitutional/General: Alert and oriented x3, well appearing, non toxic in mild distress secondary to pain  Head: NC/AT  Eyes: PERRL, EOMI  Mouth: Oropharynx clear, handling secretions, no trismus  Neck: Supple, full ROM, no meningeal signs  Pulmonary: Lungs clear to auscultation bilaterally, no wheezes, rales, or rhonchi.  Not in respiratory distress  Cardiovascular:  Regular rate and rhythm, no murmurs, gallops, or rubs. 2+ distal pulses  Abdomen: Soft, moderate right sided CVA tenderness, non distended,   Extremities: Moves all extremities x 4. Warm and well perfused  Skin: warm and dry without rash  Neurologic: GCS 15,  Psych: Normal Affect      ------------------------------ ED COURSE/MEDICAL DECISION MAKING----------------------  Medications   cefTRIAXone (ROCEPHIN) 1000 mg IVPB in 50 mL D5W minibag (has no administration in time range)   0.9 % sodium chloride bolus (0 mLs Intravenous Stopped 8/12/21 1119)   ketorolac (TORADOL) injection 15 mg (15 mg Intravenous Given 8/12/21 1021)         Medical Decision Making:    Suspected kidney infection-we will check urine and treat pain, will switch antibiotic from Cipro to CASSIDY CHANDANA and treat pain with Toradol    Counseling: The emergency provider has spoken with the patient and discussed todays results, in addition to providing specific details for the plan of care and counseling regarding the diagnosis and prognosis. Questions are answered at this time and they are agreeable with the plan.      --------------------------------- IMPRESSION AND DISPOSITION ---------------------------------    IMPRESSION  1.  Acute pyelonephritis New Problem       DISPOSITION  Disposition: Discharge to home  Patient condition is stable                  Abiodun Hudson MD  08/12/21 [id number]

## 2021-08-13 LAB
CULTURE: ABNORMAL
CULTURE: NORMAL
Lab: ABNORMAL
Lab: NORMAL
SPECIMEN DESCRIPTION: ABNORMAL
SPECIMEN DESCRIPTION: NORMAL

## 2024-02-15 ENCOUNTER — OFFICE VISIT (OUTPATIENT)
Dept: FAMILY MEDICINE CLINIC | Age: 39
End: 2024-02-15
Payer: COMMERCIAL

## 2024-02-15 VITALS
DIASTOLIC BLOOD PRESSURE: 86 MMHG | HEART RATE: 80 BPM | HEIGHT: 64 IN | WEIGHT: 194 LBS | BODY MASS INDEX: 33.12 KG/M2 | TEMPERATURE: 97.5 F | SYSTOLIC BLOOD PRESSURE: 130 MMHG | OXYGEN SATURATION: 95 %

## 2024-02-15 DIAGNOSIS — E66.9 CLASS 1 OBESITY WITHOUT SERIOUS COMORBIDITY WITH BODY MASS INDEX (BMI) OF 33.0 TO 33.9 IN ADULT, UNSPECIFIED OBESITY TYPE: ICD-10-CM

## 2024-02-15 DIAGNOSIS — R25.1 SHAKINESS: Primary | ICD-10-CM

## 2024-02-15 PROCEDURE — 99214 OFFICE O/P EST MOD 30 MIN: CPT | Performed by: FAMILY MEDICINE

## 2024-02-15 NOTE — PROGRESS NOTES
HPI Notes    Name: Katina David  : 1985        Chief Complaint:     Chief Complaint   Patient presents with    Shaking     Patient complains of shaking feeling , notices when she does not eat. After she eats she usually feels better.     Weight Management     Patient would like to discuss options of weight loss.        History of Present Illness:     Katina David is a 38 y.o.  female who presents with Shaking (Patient complains of shaking feeling , notices when she does not eat. After she eats she usually feels better. ) and Weight Management (Patient would like to discuss options of weight loss. )      HPI  Shaking - pt states she has noticed shakiness mainly when she skips a meal --- that usually occurs at work as she gets busy at work. Most days she does try to eat breakfast like a grab and go -- fruit , granola bar etc. Pt then notices then when coming home at work when she has skipped a meal she gets very shaky.  Then pt will get home and eat --- so she will feel better within 20min of eating. She drinks coffee (lately only black) and water. She does drink 2-3 Diet cans pop per day  Pt is  so in her car most of the day and gets NO set lunch time. She does pack as to not eat fast food on the road.    Weight management - Pt has been doing some meal prepping, she has done some exercise in past but little time with work and family life, ;she has been decreasing fast food eating and has tried diets, switching her creamers to plant based. Pt is frustrated as can't lose weight since she had children.  She does drink diet pop.       Past Medical History:     Past Medical History:   Diagnosis Date    Kidney stone 2015    right, 5mm nonobstructing    Ovarian cyst 2015    right      Reviewed all health maintenance requirements and ordered appropriate tests  Health Maintenance Due   Topic Date Due    Hepatitis B vaccine (1 of 3 - 3-dose series) Never done    Varicella vaccine (1 of 2

## 2024-02-16 ENCOUNTER — HOSPITAL ENCOUNTER (OUTPATIENT)
Age: 39
Discharge: HOME | End: 2024-02-16
Payer: COMMERCIAL

## 2024-02-16 DIAGNOSIS — R25.1 SHAKINESS: ICD-10-CM

## 2024-02-16 LAB
ANION GAP SERPL CALCULATED.3IONS-SCNC: 13 MMOL/L (ref 9–17)
BASOPHILS # BLD: 0.03 K/UL (ref 0–0.2)
BASOPHILS NFR BLD: 1 % (ref 0–2)
BUN SERPL-MCNC: 13 MG/DL (ref 6–20)
BUN/CREAT SERPL: 22 (ref 9–20)
CALCIUM SERPL-MCNC: 8.9 MG/DL (ref 8.6–10.4)
CHLORIDE SERPL-SCNC: 101 MMOL/L (ref 98–107)
CO2 SERPL-SCNC: 23 MMOL/L (ref 20–31)
CREAT SERPL-MCNC: 0.6 MG/DL (ref 0.5–0.9)
EOSINOPHIL # BLD: 0.27 K/UL (ref 0–0.4)
EOSINOPHILS RELATIVE PERCENT: 5 % (ref 0–5)
ERYTHROCYTE [DISTWIDTH] IN BLOOD BY AUTOMATED COUNT: 11.9 % (ref 12.1–15.2)
GFR SERPL CREATININE-BSD FRML MDRD: >60 ML/MIN/1.73M2
GLUCOSE SERPL-MCNC: 87 MG/DL (ref 70–99)
HCT VFR BLD AUTO: 40.2 % (ref 36–46)
HGB BLD-MCNC: 13.9 G/DL (ref 12–16)
IMM GRANULOCYTES # BLD AUTO: 0 K/UL (ref 0–0.3)
IMM GRANULOCYTES NFR BLD: 0 % (ref 0–5)
LYMPHOCYTES NFR BLD: 1.95 K/UL (ref 1–4.8)
LYMPHOCYTES RELATIVE PERCENT: 38 % (ref 15–40)
MCH RBC QN AUTO: 30 PG (ref 26–34)
MCHC RBC AUTO-ENTMCNC: 34.6 G/DL (ref 31–37)
MCV RBC AUTO: 86.8 FL (ref 80–100)
MONOCYTES NFR BLD: 0.47 K/UL (ref 0–1)
MONOCYTES NFR BLD: 9 % (ref 4–8)
NEUTROPHILS NFR BLD: 47 % (ref 47–75)
NEUTS SEG NFR BLD: 2.43 K/UL (ref 2.5–7)
PLATELET # BLD AUTO: 333 K/UL (ref 140–450)
PMV BLD AUTO: 8.3 FL (ref 6–12)
POTASSIUM SERPL-SCNC: 3.9 MMOL/L (ref 3.7–5.3)
RBC # BLD AUTO: 4.63 M/UL (ref 4–5.2)
SODIUM SERPL-SCNC: 137 MMOL/L (ref 135–144)
T4 FREE SERPL-MCNC: 1.3 NG/DL (ref 0.9–1.7)
TSH SERPL DL<=0.05 MIU/L-ACNC: 0.99 UIU/ML (ref 0.3–5)
WBC OTHER # BLD: 5.2 K/UL (ref 3.5–11)

## 2024-02-16 PROCEDURE — 84439 ASSAY OF FREE THYROXINE: CPT

## 2024-02-16 PROCEDURE — 36415 COLL VENOUS BLD VENIPUNCTURE: CPT

## 2024-02-16 PROCEDURE — 80048 BASIC METABOLIC PNL TOTAL CA: CPT

## 2024-02-16 PROCEDURE — 85025 COMPLETE CBC W/AUTO DIFF WBC: CPT

## 2024-02-16 PROCEDURE — 84443 ASSAY THYROID STIM HORMONE: CPT

## 2024-02-19 ENCOUNTER — TELEPHONE (OUTPATIENT)
Dept: FAMILY MEDICINE CLINIC | Age: 39
End: 2024-02-19

## 2024-02-19 DIAGNOSIS — E66.9 CLASS 1 OBESITY WITHOUT SERIOUS COMORBIDITY WITH BODY MASS INDEX (BMI) OF 33.0 TO 33.9 IN ADULT, UNSPECIFIED OBESITY TYPE: Primary | ICD-10-CM

## 2024-02-19 RX ORDER — PHENTERMINE HYDROCHLORIDE 37.5 MG/1
37.5 TABLET ORAL
Qty: 30 TABLET | Refills: 0 | Status: SHIPPED | OUTPATIENT
Start: 2024-02-19 | End: 2024-03-20

## 2024-02-19 NOTE — TELEPHONE ENCOUNTER
Patient notified of lab results and she would like to start weight loss medication  [facility] [place]

## 2024-02-19 NOTE — TELEPHONE ENCOUNTER
----- Message from Erinn Munroe MD sent at 2/19/2024  1:41 PM EST -----  Tell pt her labs:  1.) all electrolytes, kidneys normal  2.) her white and red blood cells fine  3.) normal thyroid test too  Let me know if she wants to try the wt loss medication???

## 2024-03-14 ENCOUNTER — OFFICE VISIT (OUTPATIENT)
Dept: FAMILY MEDICINE CLINIC | Age: 39
End: 2024-03-14
Payer: COMMERCIAL

## 2024-03-14 VITALS
HEART RATE: 88 BPM | SYSTOLIC BLOOD PRESSURE: 134 MMHG | OXYGEN SATURATION: 98 % | WEIGHT: 182 LBS | DIASTOLIC BLOOD PRESSURE: 90 MMHG | BODY MASS INDEX: 31.24 KG/M2

## 2024-03-14 DIAGNOSIS — E66.9 CLASS 1 OBESITY WITHOUT SERIOUS COMORBIDITY WITH BODY MASS INDEX (BMI) OF 33.0 TO 33.9 IN ADULT, UNSPECIFIED OBESITY TYPE: Primary | ICD-10-CM

## 2024-03-14 DIAGNOSIS — R03.0 ELEVATED BP WITHOUT DIAGNOSIS OF HYPERTENSION: ICD-10-CM

## 2024-03-14 PROCEDURE — 99213 OFFICE O/P EST LOW 20 MIN: CPT | Performed by: FAMILY MEDICINE

## 2024-03-14 RX ORDER — PHENTERMINE HYDROCHLORIDE 37.5 MG/1
37.5 TABLET ORAL
Qty: 30 TABLET | Refills: 0 | Status: SHIPPED | OUTPATIENT
Start: 2024-03-14 | End: 2024-04-13

## 2024-03-14 ASSESSMENT — ENCOUNTER SYMPTOMS
VOMITING: 0
NAUSEA: 0
COUGH: 0
EYE REDNESS: 0
EYE DISCHARGE: 0
SHORTNESS OF BREATH: 0

## 2024-03-14 NOTE — PROGRESS NOTES
HPI Notes    Name: Katina David  : 1985        Chief Complaint:     Chief Complaint   Patient presents with    Blood Pressure Check     Patient here today for recheck on BP    Weight Management     Refill on adipex. Today's weight is 182 lbs, last weight was 194 lbs       History of Present Illness:     Katina David is a 38 y.o.  female who presents with Blood Pressure Check (Patient here today for recheck on BP) and Weight Management (Refill on adipex. Today's weight is 182 lbs, last weight was 194 lbs)      Weight Management  This is a recurrent problem. The current episode started more than 1 year ago. The problem has been gradually improving (pt has done well and lost 12lbs). Pertinent negatives include no chest pain, chills, coughing, fever, nausea, numbness, rash or vomiting. Treatments tried: adipex for about 1mos and pt has been walking and some virtual boxing and eating healthier.  Drinking mainly water and black coffee and rare pop.     BP check - pt is doing well and BP on bottom is in the 90s. No chest pain or SOB. Pt doing well on the adipex and BP is not any higher as her BP goes up and down. Occ dizziness with position changes.     Past Medical History:     Past Medical History:   Diagnosis Date    Kidney stone 2015    right, 5mm nonobstructing    Ovarian cyst 2015    right      Reviewed all health maintenance requirements and ordered appropriate tests  Health Maintenance Due   Topic Date Due    Hepatitis B vaccine (1 of 3 - 3-dose series) Never done    Varicella vaccine (1 of 2 - 2-dose childhood series) Never done    Pneumococcal 0-64 years Vaccine (1 - PCV) Never done    HIV screen  Never done    Hepatitis C screen  Never done    Cervical cancer screen  Never done    Flu vaccine (1) Never done    COVID-19 Vaccine (3 - - season) 2023       Past Surgical History:     Past Surgical History:   Procedure Laterality Date     SECTION  2012     SECTION

## 2024-03-14 NOTE — PATIENT INSTRUCTIONS
SURVEY:    You may be receiving a survey from [facility] [employer] regarding your visit today.    Please complete the survey to enable us to provide the highest quality of care to you and your family.    If you cannot score us a very good (5 Stars) on any question, please call the office to discuss how we could have made your experience a very good one.    Thank you.    Clinical Care Team: MD Jordan Arriaga LPN              Triage: Jesica Thompson CMA              Clerical Team: Jesica Quinonez

## 2024-04-11 ENCOUNTER — OFFICE VISIT (OUTPATIENT)
Dept: FAMILY MEDICINE CLINIC | Age: 39
End: 2024-04-11
Payer: COMMERCIAL

## 2024-04-11 VITALS
WEIGHT: 172 LBS | DIASTOLIC BLOOD PRESSURE: 80 MMHG | SYSTOLIC BLOOD PRESSURE: 122 MMHG | BODY MASS INDEX: 29.52 KG/M2 | OXYGEN SATURATION: 98 % | HEART RATE: 74 BPM

## 2024-04-11 DIAGNOSIS — R03.0 ELEVATED BP WITHOUT DIAGNOSIS OF HYPERTENSION: Primary | ICD-10-CM

## 2024-04-11 DIAGNOSIS — E66.9 CLASS 1 OBESITY WITHOUT SERIOUS COMORBIDITY WITH BODY MASS INDEX (BMI) OF 33.0 TO 33.9 IN ADULT, UNSPECIFIED OBESITY TYPE: ICD-10-CM

## 2024-04-11 PROCEDURE — 99213 OFFICE O/P EST LOW 20 MIN: CPT | Performed by: FAMILY MEDICINE

## 2024-04-11 RX ORDER — PHENTERMINE HYDROCHLORIDE 37.5 MG/1
37.5 TABLET ORAL
Qty: 30 TABLET | Refills: 0 | Status: SHIPPED | OUTPATIENT
Start: 2024-04-11 | End: 2024-05-11

## 2024-04-11 ASSESSMENT — ENCOUNTER SYMPTOMS
COUGH: 0
VOMITING: 0
SHORTNESS OF BREATH: 0
EYE DISCHARGE: 0
EYE REDNESS: 0
DIARRHEA: 0

## 2024-04-11 NOTE — PROGRESS NOTES
HPI Notes    Name: Katina David  : 1985        Chief Complaint:     Chief Complaint   Patient presents with    Blood Pressure Check     Patient here today for recheck BP, you mentioned in your last OV starting her on norvasc 2.5 mg daily.Today's BP was 122/80    Weight Management     Patient here today for refill on adipex. Last weight was 182 lbs , today's weight is 172 lbs       History of Present Illness:     Katina David is a 38 y.o.  female who presents with Blood Pressure Check (Patient here today for recheck BP, you mentioned in your last OV starting her on norvasc 2.5 mg daily.Today's BP was 122/80) and Weight Management (Patient here today for refill on adipex. Last weight was 182 lbs , today's weight is 172 lbs)      HPI  Elevated BP - pt has been better here and at home. No chest pain or SOB. Pt has been losing weight on the adipex.  130/87 and 138/95 and 117/47 have been the most recent BP readings at home.     Weight management - pt has been on the adipex for 2mos and has lost 20lbs and still walking most nights. Pt thinking about trying some boxing exercise. Pt overall feeling better.  Past Medical History:     Past Medical History:   Diagnosis Date    Kidney stone 2015    right, 5mm nonobstructing    Ovarian cyst 2015    right      Reviewed all health maintenance requirements and ordered appropriate tests  Health Maintenance Due   Topic Date Due    Hepatitis B vaccine (1 of 3 - 3-dose series) Never done    Varicella vaccine (1 of 2 - 2-dose childhood series) Never done    Pneumococcal 0-64 years Vaccine (1 of 2 - PCV) Never done    HIV screen  Never done    Hepatitis C screen  Never done    Cervical cancer screen  Never done    COVID-19 Vaccine (3 - 2023- season) 2023       Past Surgical History:     Past Surgical History:   Procedure Laterality Date     SECTION  2012     SECTION  2018    OTHER SURGICAL HISTORY  16    Excision abd wall mass,

## 2024-04-11 NOTE — PATIENT INSTRUCTIONS
SURVEY:    You may be receiving a survey from [facility] [employer] regarding your visit today.    Please complete the survey to enable us to provide the highest quality of care to you and your family.    If you cannot score us a very good (5 Stars) on any question, please call the office to discuss how we could have made your experience a very good one.    Thank you.    Clinical Care Team: MD Jordan Arriaga LPN              Triage: Jesica Thompson CMA              Clerical Team: Jesica Quinonez

## 2024-05-08 ENCOUNTER — OFFICE VISIT (OUTPATIENT)
Dept: FAMILY MEDICINE CLINIC | Age: 39
End: 2024-05-08
Payer: COMMERCIAL

## 2024-05-08 VITALS
HEART RATE: 70 BPM | SYSTOLIC BLOOD PRESSURE: 122 MMHG | OXYGEN SATURATION: 97 % | BODY MASS INDEX: 28.84 KG/M2 | DIASTOLIC BLOOD PRESSURE: 86 MMHG | WEIGHT: 168 LBS

## 2024-05-08 DIAGNOSIS — E66.9 CLASS 1 OBESITY WITHOUT SERIOUS COMORBIDITY WITH BODY MASS INDEX (BMI) OF 33.0 TO 33.9 IN ADULT, UNSPECIFIED OBESITY TYPE: ICD-10-CM

## 2024-05-08 PROCEDURE — 99213 OFFICE O/P EST LOW 20 MIN: CPT | Performed by: FAMILY MEDICINE

## 2024-05-08 RX ORDER — PHENTERMINE HYDROCHLORIDE 37.5 MG/1
37.5 TABLET ORAL
Qty: 30 TABLET | Refills: 0 | Status: SHIPPED | OUTPATIENT
Start: 2024-05-08 | End: 2024-06-07

## 2024-05-08 ASSESSMENT — ENCOUNTER SYMPTOMS
COUGH: 0
EYE DISCHARGE: 0
SHORTNESS OF BREATH: 0
FACIAL SWELLING: 0
ABDOMINAL PAIN: 0
DIARRHEA: 0
EYE REDNESS: 0
CHANGE IN BOWEL HABIT: 0
VOMITING: 0

## 2024-05-08 NOTE — PROGRESS NOTES
HPI Notes    Name: Katina David  : 1985        Chief Complaint:     Chief Complaint   Patient presents with    Blood Pressure Check     Recheck on blood pressure.    Weight Management     On adipex, today's weight is 168 lbs, last weight was 172 lbs       History of Present Illness:     Katina David is a 38 y.o.  female who presents with Blood Pressure Check (Recheck on blood pressure.) and Weight Management (On adipex, today's weight is 168 lbs, last weight was 172 lbs)      Weight Management  This is a chronic problem. The current episode started more than 1 year ago. The problem has been gradually improving (pt has lost 4 more pounds and total 14lbs in 2mos). Pertinent negatives include no abdominal pain, change in bowel habit, chest pain, chills, coughing, fever, rash or vomiting. Treatments tried: pt is doing well with the adipex.       Past Medical History:     Past Medical History:   Diagnosis Date    Kidney stone 2015    right, 5mm nonobstructing    Ovarian cyst 2015    right      Reviewed all health maintenance requirements and ordered appropriate tests  Health Maintenance Due   Topic Date Due    Hepatitis B vaccine (1 of 3 - 3-dose series) Never done    Varicella vaccine (1 of 2 - 2-dose childhood series) Never done    Pneumococcal 0-64 years Vaccine (1 of 2 - PCV) Never done    HIV screen  Never done    Hepatitis C screen  Never done    Cervical cancer screen  Never done    COVID-19 Vaccine (3 - 2023- season) 2023       Past Surgical History:     Past Surgical History:   Procedure Laterality Date     SECTION       SECTION  2018    OTHER SURGICAL HISTORY  16    Excision abd wall mass, Dr. Barrios    TUBAL LIGATION          Medications:       Prior to Admission medications    Medication Sig Start Date End Date Taking? Authorizing Provider   phentermine (ADIPEX-P) 37.5 MG tablet Take 1 tablet by mouth every morning (before breakfast) for 30

## 2024-06-06 ENCOUNTER — OFFICE VISIT (OUTPATIENT)
Dept: FAMILY MEDICINE CLINIC | Age: 39
End: 2024-06-06
Payer: COMMERCIAL

## 2024-06-06 VITALS
DIASTOLIC BLOOD PRESSURE: 78 MMHG | WEIGHT: 165.8 LBS | HEART RATE: 103 BPM | BODY MASS INDEX: 27.63 KG/M2 | OXYGEN SATURATION: 98 % | HEIGHT: 65 IN | SYSTOLIC BLOOD PRESSURE: 122 MMHG

## 2024-06-06 DIAGNOSIS — E66.9 CLASS 1 OBESITY WITHOUT SERIOUS COMORBIDITY WITH BODY MASS INDEX (BMI) OF 33.0 TO 33.9 IN ADULT, UNSPECIFIED OBESITY TYPE: Primary | ICD-10-CM

## 2024-06-06 PROCEDURE — 99213 OFFICE O/P EST LOW 20 MIN: CPT | Performed by: FAMILY MEDICINE

## 2024-06-06 ASSESSMENT — ENCOUNTER SYMPTOMS
SHORTNESS OF BREATH: 0
ABDOMINAL PAIN: 0
EYE DISCHARGE: 0
NAUSEA: 0
CHANGE IN BOWEL HABIT: 0
EYE REDNESS: 0

## 2024-06-06 NOTE — PROGRESS NOTES
reports that she has been smoking cigarettes. She has never used smokeless tobacco.  Alcohol:      reports current alcohol use.  Drug Use:  reports no history of drug use.    Family History:     History reviewed. No pertinent family history.    Review of Systems:       Review of Systems   Constitutional:  Negative for chills and fever.   Eyes:  Negative for discharge and redness.   Respiratory:  Negative for shortness of breath.    Cardiovascular:  Negative for chest pain, palpitations and leg swelling.   Gastrointestinal:  Negative for abdominal pain, change in bowel habit and nausea.   Skin:  Negative for rash.         Physical Exam:     Physical Exam  Constitutional:       General: She is not in acute distress.     Appearance: Normal appearance. She is not ill-appearing.   HENT:      Head: Normocephalic and atraumatic.   Eyes:      General:         Right eye: No discharge.         Left eye: No discharge.   Cardiovascular:      Rate and Rhythm: Normal rate and regular rhythm.      Heart sounds: Normal heart sounds.   Pulmonary:      Effort: Pulmonary effort is normal. No respiratory distress.      Breath sounds: Normal breath sounds.   Abdominal:      General: There is no distension.      Tenderness: There is no abdominal tenderness.   Musculoskeletal:      Cervical back: Neck supple.   Neurological:      Mental Status: She is alert.         Vitals:  /78   Pulse (!) 103   Ht 1.638 m (5' 4.5\")   Wt 75.2 kg (165 lb 12.8 oz)   LMP 05/08/2024   SpO2 98%   BMI 28.02 kg/m²       Data:     Lab Results   Component Value Date/Time     02/16/2024 12:30 PM    K 3.9 02/16/2024 12:30 PM     02/16/2024 12:30 PM    CO2 23 02/16/2024 12:30 PM    BUN 13 02/16/2024 12:30 PM    CREATININE 0.6 02/16/2024 12:30 PM    GLUCOSE 87 02/16/2024 12:30 PM    ALKPHOS 140 07/20/2012 09:38 AM    AST 20 07/20/2012 09:38 AM    ALT 14 07/20/2012 09:38 AM     Lab Results   Component Value Date/Time    WBC 5.2 02/16/2024 12:30

## 2024-08-06 ENCOUNTER — PATIENT MESSAGE (OUTPATIENT)
Dept: FAMILY MEDICINE CLINIC | Age: 39
End: 2024-08-06

## 2024-08-06 VITALS — WEIGHT: 161 LBS | BODY MASS INDEX: 27.49 KG/M2 | HEIGHT: 64 IN

## 2024-08-06 DIAGNOSIS — E66.3 OVERWEIGHT: Primary | ICD-10-CM

## 2024-08-06 DIAGNOSIS — E66.9 CLASS 1 OBESITY WITHOUT SERIOUS COMORBIDITY WITH BODY MASS INDEX (BMI) OF 33.0 TO 33.9 IN ADULT, UNSPECIFIED OBESITY TYPE: ICD-10-CM

## 2024-08-06 RX ORDER — PHENTERMINE HYDROCHLORIDE 37.5 MG/1
37.5 TABLET ORAL
Qty: 30 TABLET | Refills: 0 | Status: SHIPPED | OUTPATIENT
Start: 2024-08-06 | End: 2024-09-05

## 2024-08-06 RX ORDER — PHENTERMINE HYDROCHLORIDE 37.5 MG/1
37.5 TABLET ORAL
Qty: 30 TABLET | Refills: 0 | OUTPATIENT
Start: 2024-08-06 | End: 2024-09-05

## 2024-08-06 NOTE — TELEPHONE ENCOUNTER
From: Katina David  To: Dr. Erinn Munroe  Sent: 8/6/2024 2:07 AM EDT  Subject: Refill needed     Dr. Munroe   I need a refill on the Adipex. [facility] [place]     Thank you   Jimmy

## 2024-08-06 NOTE — TELEPHONE ENCOUNTER
Will not qualify to stay on it with her current BMI and not having any comorbid conditions.  Looks like she has a visit with Dr. Munroe next week, and if she believes differently she may be able to do a late prescription with note to the pharmacist.

## 2024-08-28 ENCOUNTER — OFFICE VISIT (OUTPATIENT)
Dept: FAMILY MEDICINE CLINIC | Age: 39
End: 2024-08-28
Payer: COMMERCIAL

## 2024-08-28 VITALS
HEIGHT: 65 IN | SYSTOLIC BLOOD PRESSURE: 134 MMHG | HEART RATE: 84 BPM | WEIGHT: 150 LBS | BODY MASS INDEX: 24.99 KG/M2 | OXYGEN SATURATION: 98 % | DIASTOLIC BLOOD PRESSURE: 86 MMHG

## 2024-08-28 DIAGNOSIS — E66.3 OVERWEIGHT: Primary | ICD-10-CM

## 2024-08-28 DIAGNOSIS — R03.0 ELEVATED BP WITHOUT DIAGNOSIS OF HYPERTENSION: ICD-10-CM

## 2024-08-28 PROCEDURE — 99213 OFFICE O/P EST LOW 20 MIN: CPT | Performed by: FAMILY MEDICINE

## 2024-08-28 RX ORDER — PHENTERMINE HYDROCHLORIDE 37.5 MG/1
37.5 TABLET ORAL
Qty: 30 TABLET | Refills: 0 | Status: SHIPPED | OUTPATIENT
Start: 2024-08-28 | End: 2024-09-27

## 2024-08-28 ASSESSMENT — ENCOUNTER SYMPTOMS
SHORTNESS OF BREATH: 0
EYE REDNESS: 0
ABDOMINAL PAIN: 0
NAUSEA: 0
EYE DISCHARGE: 0
SORE THROAT: 0
COUGH: 0
VOMITING: 0

## 2024-08-28 NOTE — PROGRESS NOTES
HPI Notes    Name: Katina David  : 1985        Chief Complaint:     Chief Complaint   Patient presents with    Hypertension     Recheck blood pressure    Weight Management     Patient here today for adipex refill. Today's weight is 147 lbs. Last weight was 150 lbs.       History of Present Illness:     Katina David is a 38 y.o.  female who presents with Hypertension (Recheck blood pressure) and Weight Management (Patient here today for adipex refill. Today's weight is 147 lbs. Last weight was 150 lbs.)      Weight Management  This is a chronic problem. The current episode started more than 1 year ago (pt is here for her check up for weight loss and taking adipex since 2024.  Pt). The problem has been gradually improving (pt has lost 44lbs total since February as she started at 194lbs and today 150lbs.). Pertinent negatives include no abdominal pain, chills, coughing, fatigue, fever, nausea, rash, sore throat or vomiting. Treatments tried: adipex and diet with walking.     Elevated BP - pt has been monitored and stable overall with BP today. No chest pain. No SOB. No dizziness    Past Medical History:     Past Medical History:   Diagnosis Date    Kidney stone 2015    right, 5mm nonobstructing    Ovarian cyst 2015    right      Reviewed all health maintenance requirements and ordered appropriate tests  Health Maintenance Due   Topic Date Due    Pneumococcal 0-64 years Vaccine (1 of 2 - PCV) Never done    Varicella vaccine (1 of 2 - 13+ 2-dose series) Never done    HIV screen  Never done    Hepatitis C screen  Never done    Hepatitis B vaccine (1 of 3 - 19+ 3-dose series) Never done    Cervical cancer screen  Never done    COVID-19 Vaccine (3 - - season) 2023    Flu vaccine (1) Never done       Past Surgical History:     Past Surgical History:   Procedure Laterality Date     SECTION       SECTION  2018    OTHER SURGICAL HISTORY  16    Excision abd

## 2024-08-28 NOTE — PATIENT INSTRUCTIONS
SURVEY:    You may be receiving a survey from [facility] [employer] regarding your visit today.    Please complete the survey to enable us to provide the highest quality of care to you and your family.    If you cannot score us a very good (5 Stars) on any question, please call the office to discuss how we could have made your experience a very good one.    Thank you.    Clinical Care Team: MD Jordan Arriaga LPN              Triage: Jesica Thompson CMA              Clerical Team: Jesica Quinonez

## 2024-09-09 ENCOUNTER — HOSPITAL ENCOUNTER (OUTPATIENT)
Dept: GENERAL RADIOLOGY | Age: 39
Discharge: HOME | End: 2024-09-11
Payer: COMMERCIAL

## 2024-09-09 ENCOUNTER — OFFICE VISIT (OUTPATIENT)
Dept: FAMILY MEDICINE CLINIC | Age: 39
End: 2024-09-09
Payer: COMMERCIAL

## 2024-09-09 ENCOUNTER — HOSPITAL ENCOUNTER (OUTPATIENT)
Age: 39
Discharge: HOME | End: 2024-09-11
Payer: COMMERCIAL

## 2024-09-09 VITALS
HEART RATE: 99 BPM | OXYGEN SATURATION: 97 % | WEIGHT: 150 LBS | SYSTOLIC BLOOD PRESSURE: 128 MMHG | DIASTOLIC BLOOD PRESSURE: 80 MMHG | BODY MASS INDEX: 25.35 KG/M2

## 2024-09-09 DIAGNOSIS — M79.644 FINGER PAIN, RIGHT: Primary | ICD-10-CM

## 2024-09-09 DIAGNOSIS — M79.644 FINGER PAIN, RIGHT: ICD-10-CM

## 2024-09-09 DIAGNOSIS — S63.634A SPRAIN OF INTERPHALANGEAL JOINT OF RIGHT RING FINGER, INITIAL ENCOUNTER: ICD-10-CM

## 2024-09-09 PROCEDURE — 73130 X-RAY EXAM OF HAND: CPT

## 2024-09-09 PROCEDURE — 99213 OFFICE O/P EST LOW 20 MIN: CPT | Performed by: STUDENT IN AN ORGANIZED HEALTH CARE EDUCATION/TRAINING PROGRAM

## 2024-09-09 ASSESSMENT — ENCOUNTER SYMPTOMS
SINUS PAIN: 0
DIARRHEA: 0
VOMITING: 0
NAUSEA: 0
RHINORRHEA: 0
ABDOMINAL PAIN: 0
BACK PAIN: 0
WHEEZING: 0
COUGH: 0

## 2024-09-24 ENCOUNTER — OFFICE VISIT (OUTPATIENT)
Dept: FAMILY MEDICINE CLINIC | Age: 39
End: 2024-09-24
Payer: COMMERCIAL

## 2024-09-24 ENCOUNTER — HOSPITAL ENCOUNTER (OUTPATIENT)
Dept: GENERAL RADIOLOGY | Age: 39
Discharge: HOME | End: 2024-09-26
Payer: COMMERCIAL

## 2024-09-24 ENCOUNTER — HOSPITAL ENCOUNTER (OUTPATIENT)
Age: 39
Discharge: HOME | End: 2024-09-26
Payer: COMMERCIAL

## 2024-09-24 VITALS
HEART RATE: 95 BPM | WEIGHT: 155 LBS | OXYGEN SATURATION: 99 % | DIASTOLIC BLOOD PRESSURE: 88 MMHG | SYSTOLIC BLOOD PRESSURE: 134 MMHG | BODY MASS INDEX: 26.19 KG/M2

## 2024-09-24 DIAGNOSIS — R29.898 DECREASED GRIP STRENGTH: ICD-10-CM

## 2024-09-24 DIAGNOSIS — M79.641 RIGHT HAND PAIN: ICD-10-CM

## 2024-09-24 DIAGNOSIS — M79.644 FINGER PAIN, RIGHT: ICD-10-CM

## 2024-09-24 DIAGNOSIS — M79.644 FINGER PAIN, RIGHT: Primary | ICD-10-CM

## 2024-09-24 PROCEDURE — 99213 OFFICE O/P EST LOW 20 MIN: CPT | Performed by: STUDENT IN AN ORGANIZED HEALTH CARE EDUCATION/TRAINING PROGRAM

## 2024-09-24 PROCEDURE — 73130 X-RAY EXAM OF HAND: CPT

## 2024-09-24 ASSESSMENT — ENCOUNTER SYMPTOMS
BACK PAIN: 0
DIARRHEA: 0
SINUS PAIN: 0
ABDOMINAL PAIN: 0
RHINORRHEA: 0
NAUSEA: 0
COUGH: 0
VOMITING: 0
WHEEZING: 0

## 2024-10-02 ENCOUNTER — HOSPITAL ENCOUNTER (OUTPATIENT)
Dept: MRI IMAGING | Age: 39
Discharge: HOME | End: 2024-10-04
Attending: STUDENT IN AN ORGANIZED HEALTH CARE EDUCATION/TRAINING PROGRAM
Payer: COMMERCIAL

## 2024-10-02 DIAGNOSIS — M79.644 FINGER PAIN, RIGHT: ICD-10-CM

## 2024-10-02 DIAGNOSIS — M79.641 RIGHT HAND PAIN: ICD-10-CM

## 2024-10-02 DIAGNOSIS — R29.898 DECREASED GRIP STRENGTH: ICD-10-CM

## 2024-10-02 PROCEDURE — 73218 MRI UPPER EXTREMITY W/O DYE: CPT

## 2024-10-19 ENCOUNTER — PATIENT MESSAGE (OUTPATIENT)
Dept: FAMILY MEDICINE CLINIC | Age: 39
End: 2024-10-19

## 2024-10-19 DIAGNOSIS — E66.3 OVERWEIGHT: ICD-10-CM

## 2024-10-21 RX ORDER — PHENTERMINE HYDROCHLORIDE 37.5 MG/1
37.5 TABLET ORAL
Qty: 30 TABLET | Refills: 0 | Status: SHIPPED | OUTPATIENT
Start: 2024-10-21 | End: 2024-11-20

## 2024-10-21 NOTE — TELEPHONE ENCOUNTER
Critical potassium 2.73 received from lab and [place], [place] notified. Last OV: 9/24/2024  finger pain 08/28/24 weight management   Last RX:    Next scheduled apt: 10/28/2024 2 months weight management         Pt requesting a refill   Medication pending for approval

## 2024-10-31 ENCOUNTER — OFFICE VISIT (OUTPATIENT)
Dept: FAMILY MEDICINE CLINIC | Age: 39
End: 2024-10-31

## 2024-10-31 VITALS
OXYGEN SATURATION: 97 % | BODY MASS INDEX: 25.86 KG/M2 | WEIGHT: 153 LBS | HEART RATE: 82 BPM | SYSTOLIC BLOOD PRESSURE: 124 MMHG | DIASTOLIC BLOOD PRESSURE: 84 MMHG

## 2024-10-31 DIAGNOSIS — E66.3 OVERWEIGHT: ICD-10-CM

## 2024-10-31 DIAGNOSIS — M79.644 PAIN OF FINGER OF RIGHT HAND: Primary | ICD-10-CM

## 2024-10-31 DIAGNOSIS — M25.441 SWELLING OF FINGER JOINT OF RIGHT HAND: ICD-10-CM

## 2024-10-31 ASSESSMENT — ENCOUNTER SYMPTOMS
CHANGE IN BOWEL HABIT: 0
NAUSEA: 0
EYE REDNESS: 0
EYE DISCHARGE: 0
VOMITING: 0

## 2024-10-31 NOTE — PROGRESS NOTES
Never done    Hepatitis B vaccine (1 of 3 - 19+ 3-dose series) Never done    Cervical cancer screen  Never done    Flu vaccine (1) Never done    COVID-19 Vaccine (3 - 2023-24 season) 2024       Past Surgical History:     Past Surgical History:   Procedure Laterality Date     SECTION       SECTION  2018    OTHER SURGICAL HISTORY  16    Excision abd wall mass, Dr. Barrios    TUBAL LIGATION          Medications:       Prior to Admission medications    Medication Sig Start Date End Date Taking? Authorizing Provider   phentermine (ADIPEX-P) 37.5 MG tablet Take 1 tablet by mouth every morning (before breakfast) for 30 days. Max Daily Amount: 37.5 mg 10/21/24 11/20/24 Yes Erinn Munroe MD        Allergies:       Septra [sulfamethoxazole-trimethoprim] and Sulfamethoxazole-trimethoprim    Social History:     Tobacco:    reports that she has been smoking cigarettes. She has never used smokeless tobacco.  Alcohol:      reports current alcohol use.  Drug Use:  reports no history of drug use.    Family History:     History reviewed. No pertinent family history.    Review of Systems:       Review of Systems   Constitutional:  Negative for chills and fever.   Eyes:  Negative for discharge and redness.   Gastrointestinal:  Negative for change in bowel habit, nausea and vomiting.   Musculoskeletal:  Positive for arthralgias.        Rt ring finger pain    Skin:  Negative for rash.   Neurological:  Negative for numbness.         Physical Exam:     Physical Exam  Vitals reviewed.   Constitutional:       General: She is not in acute distress.     Appearance: Normal appearance. She is not ill-appearing.   HENT:      Head: Normocephalic and atraumatic.   Cardiovascular:      Pulses: Normal pulses.      Heart sounds: Normal heart sounds.   Pulmonary:      Effort: Pulmonary effort is normal. No respiratory distress.      Breath sounds: Normal breath sounds.   Musculoskeletal:      Cervical back:

## 2024-10-31 NOTE — PATIENT INSTRUCTIONS
SURVEY:    You may be receiving a survey from [facility] [employer] regarding your visit today.    Please complete the survey to enable us to provide the highest quality of care to you and your family.    If you cannot score us a very good (5 Stars) on any question, please call the office to discuss how we could have made your experience a very good one.    Thank you.    Clinical Care Team: MD Jordan Arriaga LPN              Triage: Jesica Thompson CMA              Clerical Team: Jesica Quinonez

## 2024-12-04 ENCOUNTER — PATIENT MESSAGE (OUTPATIENT)
Dept: FAMILY MEDICINE CLINIC | Age: 39
End: 2024-12-04

## 2024-12-04 DIAGNOSIS — E66.3 OVERWEIGHT: ICD-10-CM

## 2024-12-04 RX ORDER — PHENTERMINE HYDROCHLORIDE 37.5 MG/1
37.5 TABLET ORAL
Qty: 30 TABLET | Refills: 0 | Status: SHIPPED | OUTPATIENT
Start: 2024-12-04 | End: 2025-01-03

## 2024-12-30 ENCOUNTER — PATIENT MESSAGE (OUTPATIENT)
Dept: FAMILY MEDICINE CLINIC | Age: 39
End: 2024-12-30

## 2025-02-28 ENCOUNTER — PATIENT MESSAGE (OUTPATIENT)
Dept: FAMILY MEDICINE CLINIC | Age: 40
End: 2025-02-28

## 2025-02-28 DIAGNOSIS — E66.811 CLASS 1 OBESITY WITHOUT SERIOUS COMORBIDITY WITH BODY MASS INDEX (BMI) OF 33.0 TO 33.9 IN ADULT, UNSPECIFIED OBESITY TYPE: ICD-10-CM

## 2025-02-28 DIAGNOSIS — E66.3 OVERWEIGHT: ICD-10-CM

## 2025-02-28 RX ORDER — PHENTERMINE HYDROCHLORIDE 37.5 MG/1
37.5 TABLET ORAL
Qty: 30 TABLET | Refills: 0 | Status: CANCELLED | OUTPATIENT
Start: 2025-02-28 | End: 2025-03-30

## 2025-02-28 RX ORDER — PHENTERMINE HYDROCHLORIDE 37.5 MG/1
37.5 TABLET ORAL
Qty: 30 TABLET | Refills: 0 | Status: SHIPPED | OUTPATIENT
Start: 2025-02-28 | End: 2025-03-30

## 2025-06-13 ENCOUNTER — PATIENT MESSAGE (OUTPATIENT)
Dept: FAMILY MEDICINE CLINIC | Age: 40
End: 2025-06-13

## 2025-06-13 DIAGNOSIS — E66.3 OVERWEIGHT: ICD-10-CM

## 2025-06-13 RX ORDER — PHENTERMINE HYDROCHLORIDE 37.5 MG/1
37.5 TABLET ORAL
Qty: 30 TABLET | Refills: 0 | Status: SHIPPED | OUTPATIENT
Start: 2025-06-13 | End: 2025-07-13